# Patient Record
Sex: MALE | Race: WHITE | NOT HISPANIC OR LATINO | Employment: OTHER | URBAN - NONMETROPOLITAN AREA
[De-identification: names, ages, dates, MRNs, and addresses within clinical notes are randomized per-mention and may not be internally consistent; named-entity substitution may affect disease eponyms.]

---

## 2017-04-06 ENCOUNTER — APPOINTMENT (OUTPATIENT)
Dept: GENERAL RADIOLOGY | Facility: HOSPITAL | Age: 62
End: 2017-04-06

## 2017-04-06 ENCOUNTER — APPOINTMENT (OUTPATIENT)
Dept: CT IMAGING | Facility: HOSPITAL | Age: 62
End: 2017-04-06

## 2017-04-06 ENCOUNTER — HOSPITAL ENCOUNTER (EMERGENCY)
Facility: HOSPITAL | Age: 62
Discharge: OTHER INSTITUTION | End: 2017-04-07
Attending: EMERGENCY MEDICINE | Admitting: EMERGENCY MEDICINE

## 2017-04-06 DIAGNOSIS — R50.9 FEVER AND CHILLS: ICD-10-CM

## 2017-04-06 DIAGNOSIS — R41.82 ALTERED MENTAL STATUS, UNSPECIFIED: Primary | ICD-10-CM

## 2017-04-06 DIAGNOSIS — D72.829 LEUKOCYTOSIS, UNSPECIFIED TYPE: ICD-10-CM

## 2017-04-06 DIAGNOSIS — R53.1 GENERALIZED WEAKNESS: ICD-10-CM

## 2017-04-06 LAB
ALBUMIN SERPL-MCNC: 5.1 G/DL (ref 3.4–4.8)
ALBUMIN/GLOB SERPL: 1.6 G/DL (ref 1.5–2.5)
ALP SERPL-CCNC: 58 U/L (ref 40–129)
ALT SERPL W P-5'-P-CCNC: 32 U/L (ref 10–44)
AMPHET+METHAMPHET UR QL: NEGATIVE
AMYLASE SERPL-CCNC: 67 U/L (ref 28–100)
ANION GAP SERPL CALCULATED.3IONS-SCNC: 9.1 MMOL/L (ref 3.6–11.2)
AST SERPL-CCNC: 23 U/L (ref 10–34)
BACTERIA UR QL AUTO: ABNORMAL /HPF
BARBITURATES UR QL SCN: NEGATIVE
BASOPHILS # BLD AUTO: 0.02 10*3/MM3 (ref 0–0.3)
BASOPHILS NFR BLD AUTO: 0.1 % (ref 0–2)
BENZODIAZ UR QL SCN: NEGATIVE
BILIRUB SERPL-MCNC: 0.9 MG/DL (ref 0.2–1.8)
BILIRUB UR QL STRIP: NEGATIVE
BNP SERPL-MCNC: 75 PG/ML (ref 0–100)
BUN BLD-MCNC: 14 MG/DL (ref 7–21)
BUN/CREAT SERPL: 10.6 (ref 7–25)
CALCIUM SPEC-SCNC: 9.8 MG/DL (ref 7.7–10)
CANNABINOIDS SERPL QL: NEGATIVE
CHLORIDE SERPL-SCNC: 104 MMOL/L (ref 99–112)
CLARITY UR: CLEAR
CO2 SERPL-SCNC: 26.9 MMOL/L (ref 24.3–31.9)
COCAINE UR QL: NEGATIVE
COLOR UR: YELLOW
CREAT BLD-MCNC: 1.32 MG/DL (ref 0.43–1.29)
CRP SERPL-MCNC: <0.5 MG/DL (ref 0–0.99)
D-LACTATE SERPL-SCNC: 1.7 MMOL/L (ref 0.5–2)
DEPRECATED RDW RBC AUTO: 43.5 FL (ref 37–54)
EOSINOPHIL # BLD AUTO: 0 10*3/MM3 (ref 0–0.7)
EOSINOPHIL NFR BLD AUTO: 0 % (ref 0–5)
ERYTHROCYTE [DISTWIDTH] IN BLOOD BY AUTOMATED COUNT: 13.4 % (ref 11.5–14.5)
ERYTHROCYTE [SEDIMENTATION RATE] IN BLOOD: 7 MM/HR (ref 0–20)
ETHANOL BLD-MCNC: <10 MG/DL
ETHANOL UR QL: <0.01 %
GFR SERPL CREATININE-BSD FRML MDRD: 55 ML/MIN/1.73
GLOBULIN UR ELPH-MCNC: 3.1 GM/DL
GLUCOSE BLD-MCNC: 252 MG/DL (ref 70–110)
GLUCOSE BLDC GLUCOMTR-MCNC: 214 MG/DL (ref 70–130)
GLUCOSE UR STRIP-MCNC: ABNORMAL MG/DL
HCT VFR BLD AUTO: 48.9 % (ref 42–52)
HGB BLD-MCNC: 16.3 G/DL (ref 14–18)
HGB UR QL STRIP.AUTO: ABNORMAL
HYALINE CASTS UR QL AUTO: ABNORMAL /LPF
IMM GRANULOCYTES # BLD: 0.03 10*3/MM3 (ref 0–0.03)
IMM GRANULOCYTES NFR BLD: 0.2 % (ref 0–0.5)
INR PPP: 0.97 (ref 0.8–1.1)
KETONES UR QL STRIP: ABNORMAL
LEUKOCYTE ESTERASE UR QL STRIP.AUTO: NEGATIVE
LIPASE SERPL-CCNC: 34 U/L (ref 13–60)
LYMPHOCYTES # BLD AUTO: 1.02 10*3/MM3 (ref 1–3)
LYMPHOCYTES NFR BLD AUTO: 6.8 % (ref 21–51)
MAGNESIUM SERPL-MCNC: 1.6 MG/DL (ref 1.7–2.6)
MCH RBC QN AUTO: 29.6 PG (ref 27–33)
MCHC RBC AUTO-ENTMCNC: 33.3 G/DL (ref 33–37)
MCV RBC AUTO: 88.9 FL (ref 80–94)
METHADONE UR QL SCN: NEGATIVE
MONOCYTES # BLD AUTO: 0.55 10*3/MM3 (ref 0.1–0.9)
MONOCYTES NFR BLD AUTO: 3.6 % (ref 0–10)
NEUTROPHILS # BLD AUTO: 13.46 10*3/MM3 (ref 1.4–6.5)
NEUTROPHILS NFR BLD AUTO: 89.3 % (ref 30–70)
NITRITE UR QL STRIP: NEGATIVE
OPIATES UR QL: NEGATIVE
OSMOLALITY SERPL CALC.SUM OF ELEC: 288.4 MOSM/KG (ref 273–305)
OXYCODONE UR QL SCN: NEGATIVE
PCP UR QL SCN: NEGATIVE
PH UR STRIP.AUTO: 5.5 [PH] (ref 5–8)
PHOSPHATE SERPL-MCNC: 3 MG/DL (ref 2.7–4.5)
PLATELET # BLD AUTO: 222 10*3/MM3 (ref 130–400)
PMV BLD AUTO: 11.6 FL (ref 6–10)
POTASSIUM BLD-SCNC: 4.1 MMOL/L (ref 3.5–5.3)
PROPOXYPH UR QL: NEGATIVE
PROT SERPL-MCNC: 8.2 G/DL (ref 6–8)
PROT UR QL STRIP: ABNORMAL
PROTHROMBIN TIME: 10.8 SECONDS (ref 9.8–11.9)
RBC # BLD AUTO: 5.5 10*6/MM3 (ref 4.7–6.1)
RBC # UR: ABNORMAL /HPF
REF LAB TEST METHOD: ABNORMAL
SODIUM BLD-SCNC: 140 MMOL/L (ref 135–153)
SP GR UR STRIP: >=1.03 (ref 1–1.03)
SQUAMOUS #/AREA URNS HPF: ABNORMAL /HPF
TROPONIN I SERPL-MCNC: 0.01 NG/ML
TSH SERPL DL<=0.05 MIU/L-ACNC: 0.54 MIU/ML (ref 0.55–4.78)
UROBILINOGEN UR QL STRIP: ABNORMAL
WBC NRBC COR # BLD: 15.08 10*3/MM3 (ref 4.5–12.5)
WBC UR QL AUTO: ABNORMAL /HPF

## 2017-04-06 PROCEDURE — 86140 C-REACTIVE PROTEIN: CPT | Performed by: EMERGENCY MEDICINE

## 2017-04-06 PROCEDURE — 87186 SC STD MICRODIL/AGAR DIL: CPT | Performed by: EMERGENCY MEDICINE

## 2017-04-06 PROCEDURE — 85610 PROTHROMBIN TIME: CPT | Performed by: EMERGENCY MEDICINE

## 2017-04-06 PROCEDURE — 80307 DRUG TEST PRSMV CHEM ANLYZR: CPT | Performed by: EMERGENCY MEDICINE

## 2017-04-06 PROCEDURE — 93005 ELECTROCARDIOGRAM TRACING: CPT | Performed by: EMERGENCY MEDICINE

## 2017-04-06 PROCEDURE — 85652 RBC SED RATE AUTOMATED: CPT | Performed by: EMERGENCY MEDICINE

## 2017-04-06 PROCEDURE — 84100 ASSAY OF PHOSPHORUS: CPT | Performed by: EMERGENCY MEDICINE

## 2017-04-06 PROCEDURE — 87147 CULTURE TYPE IMMUNOLOGIC: CPT | Performed by: EMERGENCY MEDICINE

## 2017-04-06 PROCEDURE — 99285 EMERGENCY DEPT VISIT HI MDM: CPT

## 2017-04-06 PROCEDURE — 36415 COLL VENOUS BLD VENIPUNCTURE: CPT

## 2017-04-06 PROCEDURE — 80053 COMPREHEN METABOLIC PANEL: CPT | Performed by: EMERGENCY MEDICINE

## 2017-04-06 PROCEDURE — 71010 XR CHEST 1 VW: CPT | Performed by: RADIOLOGY

## 2017-04-06 PROCEDURE — 70450 CT HEAD/BRAIN W/O DYE: CPT

## 2017-04-06 PROCEDURE — 87077 CULTURE AEROBIC IDENTIFY: CPT | Performed by: EMERGENCY MEDICINE

## 2017-04-06 PROCEDURE — 82150 ASSAY OF AMYLASE: CPT | Performed by: EMERGENCY MEDICINE

## 2017-04-06 PROCEDURE — 83880 ASSAY OF NATRIURETIC PEPTIDE: CPT | Performed by: EMERGENCY MEDICINE

## 2017-04-06 PROCEDURE — 82962 GLUCOSE BLOOD TEST: CPT

## 2017-04-06 PROCEDURE — 93010 ELECTROCARDIOGRAM REPORT: CPT | Performed by: INTERNAL MEDICINE

## 2017-04-06 PROCEDURE — 81001 URINALYSIS AUTO W/SCOPE: CPT | Performed by: EMERGENCY MEDICINE

## 2017-04-06 PROCEDURE — 87040 BLOOD CULTURE FOR BACTERIA: CPT | Performed by: EMERGENCY MEDICINE

## 2017-04-06 PROCEDURE — 83690 ASSAY OF LIPASE: CPT | Performed by: EMERGENCY MEDICINE

## 2017-04-06 PROCEDURE — 84443 ASSAY THYROID STIM HORMONE: CPT | Performed by: EMERGENCY MEDICINE

## 2017-04-06 PROCEDURE — 85025 COMPLETE CBC W/AUTO DIFF WBC: CPT | Performed by: EMERGENCY MEDICINE

## 2017-04-06 PROCEDURE — 83605 ASSAY OF LACTIC ACID: CPT | Performed by: EMERGENCY MEDICINE

## 2017-04-06 PROCEDURE — 70450 CT HEAD/BRAIN W/O DYE: CPT | Performed by: RADIOLOGY

## 2017-04-06 PROCEDURE — 71010 HC CHEST PA OR AP: CPT

## 2017-04-06 PROCEDURE — 83735 ASSAY OF MAGNESIUM: CPT | Performed by: EMERGENCY MEDICINE

## 2017-04-06 PROCEDURE — 96361 HYDRATE IV INFUSION ADD-ON: CPT

## 2017-04-06 PROCEDURE — 96375 TX/PRO/DX INJ NEW DRUG ADDON: CPT

## 2017-04-06 PROCEDURE — 84484 ASSAY OF TROPONIN QUANT: CPT | Performed by: EMERGENCY MEDICINE

## 2017-04-06 RX ORDER — SODIUM CHLORIDE 0.9 % (FLUSH) 0.9 %
10 SYRINGE (ML) INJECTION AS NEEDED
Status: DISCONTINUED | OUTPATIENT
Start: 2017-04-06 | End: 2017-04-08 | Stop reason: HOSPADM

## 2017-04-06 RX ORDER — ACETAMINOPHEN 325 MG/1
1000 TABLET ORAL ONCE
Status: COMPLETED | OUTPATIENT
Start: 2017-04-06 | End: 2017-04-06

## 2017-04-06 RX ORDER — LISINOPRIL 20 MG/1
20 TABLET ORAL DAILY
COMMUNITY

## 2017-04-06 RX ORDER — GLIMEPIRIDE 2 MG/1
2 TABLET ORAL
COMMUNITY

## 2017-04-06 RX ADMIN — SODIUM CHLORIDE 500 ML: 9 INJECTION, SOLUTION INTRAVENOUS at 21:30

## 2017-04-06 RX ADMIN — ACETAMINOPHEN 975 MG: 325 TABLET, FILM COATED ORAL at 21:32

## 2017-04-07 VITALS
WEIGHT: 217 LBS | OXYGEN SATURATION: 96 % | SYSTOLIC BLOOD PRESSURE: 156 MMHG | DIASTOLIC BLOOD PRESSURE: 82 MMHG | HEART RATE: 64 BPM | TEMPERATURE: 98.5 F | BODY MASS INDEX: 32.14 KG/M2 | HEIGHT: 69 IN | RESPIRATION RATE: 14 BRPM

## 2017-04-07 LAB
A-A DO2: 28.9 MMHG (ref 0–300)
APPEARANCE CSF: CLEAR
APPEARANCE CSF: CLEAR
ARTERIAL PATENCY WRIST A: ABNORMAL
ATMOSPHERIC PRESS: 725 MMHG
BASE EXCESS BLDA CALC-SCNC: 0.9 MMOL/L
BDY SITE: ABNORMAL
BODY TEMPERATURE: 98.6 C
COHGB MFR BLD: 2.1 % (ref 0–5)
COLOR CSF: ABNORMAL
COLOR CSF: COLORLESS
FLUAV AG NPH QL: NEGATIVE
FLUBV AG NPH QL IA: NEGATIVE
GLUCOSE BLDC GLUCOMTR-MCNC: 158 MG/DL (ref 70–130)
GLUCOSE CSF-MCNC: 124 MG/DL (ref 50–75)
HAEM INFLU AG CSF QL: NEGATIVE
HCO3 BLDA-SCNC: 25.1 MMOL/L (ref 22–26)
HCT VFR BLD CALC: 46 % (ref 42–52)
HGB BLDA-MCNC: 15.5 G/DL (ref 12–16)
HOROWITZ INDEX BLD+IHG-RTO: 21 %
METHGB BLD QL: 0.4 % (ref 0–3)
MODALITY: ABNORMAL
N MEN SG A+Y AG CSF QL: NEGATIVE
N MEN SG B AG CSF QL: NEGATIVE
N MEN SG C+W135 AG CSF QL: NEGATIVE
NUC CELL # CSF MANUAL: 0 /MM3 (ref 0–5)
NUC CELL # CSF MANUAL: 0 /MM3 (ref 0–5)
OXYHGB MFR BLDV: 91.5 % (ref 85–100)
PCO2 BLDA: 39.1 MM HG (ref 35–45)
PH BLDA: 7.43 PH UNITS (ref 7.35–7.45)
PO2 BLDA: 66.7 MM HG (ref 80–100)
PROT CSF-MCNC: 32.2 MG/DL (ref 15–45)
RBC # CSF MANUAL: 2850 /MM3 (ref 0–0)
RBC # CSF MANUAL: 41 /MM3 (ref 0–0)
S PNEUM AG SER QL: NEGATIVE
SAO2 % BLDCOA: 93.8 % (ref 90–100)
SPECIMEN VOL CSF: 1 ML
SPECIMEN VOL CSF: 1 ML
TUBE # CSF: 1
TUBE # CSF: 4

## 2017-04-07 PROCEDURE — 25010000002 ONDANSETRON PER 1 MG: Performed by: EMERGENCY MEDICINE

## 2017-04-07 PROCEDURE — 25010000002 CEFTRIAXONE: Performed by: EMERGENCY MEDICINE

## 2017-04-07 PROCEDURE — 96367 TX/PROPH/DG ADDL SEQ IV INF: CPT

## 2017-04-07 PROCEDURE — 87206 SMEAR FLUORESCENT/ACID STAI: CPT | Performed by: EMERGENCY MEDICINE

## 2017-04-07 PROCEDURE — 25010000002 ZIPRASIDONE MESYLATE PER 10 MG

## 2017-04-07 PROCEDURE — 88112 CYTOPATH CELL ENHANCE TECH: CPT | Performed by: EMERGENCY MEDICINE

## 2017-04-07 PROCEDURE — 86403 PARTICLE AGGLUT ANTBDY SCRN: CPT | Performed by: EMERGENCY MEDICINE

## 2017-04-07 PROCEDURE — 96366 THER/PROPH/DIAG IV INF ADDON: CPT

## 2017-04-07 PROCEDURE — 82375 ASSAY CARBOXYHB QUANT: CPT | Performed by: EMERGENCY MEDICINE

## 2017-04-07 PROCEDURE — 96376 TX/PRO/DX INJ SAME DRUG ADON: CPT

## 2017-04-07 PROCEDURE — 87116 MYCOBACTERIA CULTURE: CPT | Performed by: EMERGENCY MEDICINE

## 2017-04-07 PROCEDURE — 82805 BLOOD GASES W/O2 SATURATION: CPT | Performed by: EMERGENCY MEDICINE

## 2017-04-07 PROCEDURE — 89050 BODY FLUID CELL COUNT: CPT | Performed by: EMERGENCY MEDICINE

## 2017-04-07 PROCEDURE — 87529 HSV DNA AMP PROBE: CPT | Performed by: EMERGENCY MEDICINE

## 2017-04-07 PROCEDURE — 25010000002 LORAZEPAM PER 2 MG: Performed by: EMERGENCY MEDICINE

## 2017-04-07 PROCEDURE — 82945 GLUCOSE OTHER FLUID: CPT | Performed by: EMERGENCY MEDICINE

## 2017-04-07 PROCEDURE — 25010000002 LORAZEPAM PER 2 MG

## 2017-04-07 PROCEDURE — 83916 OLIGOCLONAL BANDS: CPT | Performed by: EMERGENCY MEDICINE

## 2017-04-07 PROCEDURE — 84157 ASSAY OF PROTEIN OTHER: CPT | Performed by: EMERGENCY MEDICINE

## 2017-04-07 PROCEDURE — 87015 SPECIMEN INFECT AGNT CONCNTJ: CPT | Performed by: EMERGENCY MEDICINE

## 2017-04-07 PROCEDURE — 87899 AGENT NOS ASSAY W/OPTIC: CPT | Performed by: EMERGENCY MEDICINE

## 2017-04-07 PROCEDURE — 25010000002 VANCOMYCIN PER 500 MG: Performed by: EMERGENCY MEDICINE

## 2017-04-07 PROCEDURE — 36600 WITHDRAWAL OF ARTERIAL BLOOD: CPT | Performed by: EMERGENCY MEDICINE

## 2017-04-07 PROCEDURE — 87070 CULTURE OTHR SPECIMN AEROBIC: CPT | Performed by: EMERGENCY MEDICINE

## 2017-04-07 PROCEDURE — 96372 THER/PROPH/DIAG INJ SC/IM: CPT

## 2017-04-07 PROCEDURE — 96375 TX/PRO/DX INJ NEW DRUG ADDON: CPT

## 2017-04-07 PROCEDURE — 87804 INFLUENZA ASSAY W/OPTIC: CPT | Performed by: EMERGENCY MEDICINE

## 2017-04-07 PROCEDURE — 82962 GLUCOSE BLOOD TEST: CPT

## 2017-04-07 PROCEDURE — 96365 THER/PROPH/DIAG IV INF INIT: CPT

## 2017-04-07 PROCEDURE — 86592 SYPHILIS TEST NON-TREP QUAL: CPT | Performed by: EMERGENCY MEDICINE

## 2017-04-07 PROCEDURE — 83050 HGB METHEMOGLOBIN QUAN: CPT | Performed by: EMERGENCY MEDICINE

## 2017-04-07 PROCEDURE — 87205 SMEAR GRAM STAIN: CPT | Performed by: EMERGENCY MEDICINE

## 2017-04-07 RX ORDER — LORAZEPAM 2 MG/ML
1 INJECTION INTRAMUSCULAR ONCE
Status: COMPLETED | OUTPATIENT
Start: 2017-04-07 | End: 2017-04-07

## 2017-04-07 RX ORDER — WATER 1000 ML/1000ML
INJECTION, SOLUTION INTRAVENOUS
Status: DISPENSED
Start: 2017-04-07 | End: 2017-04-07

## 2017-04-07 RX ORDER — ACETAMINOPHEN 650 MG/1
650 SUPPOSITORY RECTAL ONCE
Status: COMPLETED | OUTPATIENT
Start: 2017-04-07 | End: 2017-04-07

## 2017-04-07 RX ORDER — ACETAMINOPHEN 650 MG/1
SUPPOSITORY RECTAL
Status: COMPLETED
Start: 2017-04-07 | End: 2017-04-07

## 2017-04-07 RX ORDER — LORAZEPAM 2 MG/ML
INJECTION INTRAMUSCULAR
Status: COMPLETED
Start: 2017-04-07 | End: 2017-04-07

## 2017-04-07 RX ORDER — ALUMINA, MAGNESIA, AND SIMETHICONE 2400; 2400; 240 MG/30ML; MG/30ML; MG/30ML
15 SUSPENSION ORAL ONCE
Status: COMPLETED | OUTPATIENT
Start: 2017-04-07 | End: 2017-04-07

## 2017-04-07 RX ORDER — FAMOTIDINE 10 MG/ML
20 INJECTION, SOLUTION INTRAVENOUS ONCE
Status: COMPLETED | OUTPATIENT
Start: 2017-04-07 | End: 2017-04-07

## 2017-04-07 RX ORDER — ONDANSETRON 2 MG/ML
4 INJECTION INTRAMUSCULAR; INTRAVENOUS ONCE
Status: COMPLETED | OUTPATIENT
Start: 2017-04-07 | End: 2017-04-07

## 2017-04-07 RX ORDER — HALOPERIDOL 5 MG/ML
0.5 INJECTION INTRAMUSCULAR ONCE
Status: DISCONTINUED | OUTPATIENT
Start: 2017-04-07 | End: 2017-04-08 | Stop reason: HOSPADM

## 2017-04-07 RX ORDER — PANTOPRAZOLE SODIUM 40 MG/10ML
40 INJECTION, POWDER, LYOPHILIZED, FOR SOLUTION INTRAVENOUS ONCE
Status: COMPLETED | OUTPATIENT
Start: 2017-04-07 | End: 2017-04-07

## 2017-04-07 RX ORDER — HALOPERIDOL 5 MG/ML
INJECTION INTRAMUSCULAR
Status: DISPENSED
Start: 2017-04-07 | End: 2017-04-07

## 2017-04-07 RX ORDER — ZIPRASIDONE MESYLATE 20 MG/ML
INJECTION, POWDER, LYOPHILIZED, FOR SOLUTION INTRAMUSCULAR
Status: COMPLETED
Start: 2017-04-07 | End: 2017-04-07

## 2017-04-07 RX ORDER — PHENOBARBITAL, HYOSCYAMINE SULFATE, ATROPINE SULFATE AND SCOPOLAMINE HYDROBROMIDE .0194; .1037; 16.2; .0065 MG/1; MG/1; MG/1; MG/1
2 TABLET ORAL ONCE
Status: COMPLETED | OUTPATIENT
Start: 2017-04-07 | End: 2017-04-07

## 2017-04-07 RX ORDER — ZIPRASIDONE MESYLATE 20 MG/ML
10 INJECTION, POWDER, LYOPHILIZED, FOR SOLUTION INTRAMUSCULAR ONCE
Status: COMPLETED | OUTPATIENT
Start: 2017-04-07 | End: 2017-04-07

## 2017-04-07 RX ORDER — LIDOCAINE HYDROCHLORIDE 20 MG/ML
INJECTION, SOLUTION INFILTRATION; PERINEURAL
Status: DISPENSED
Start: 2017-04-07 | End: 2017-04-07

## 2017-04-07 RX ADMIN — ACETAMINOPHEN 650 MG: 650 SUPPOSITORY RECTAL at 02:33

## 2017-04-07 RX ADMIN — LORAZEPAM 1 MG: 2 INJECTION INTRAMUSCULAR; INTRAVENOUS at 01:40

## 2017-04-07 RX ADMIN — PANTOPRAZOLE SODIUM 40 MG: 40 INJECTION, POWDER, FOR SOLUTION INTRAVENOUS at 20:54

## 2017-04-07 RX ADMIN — PHENOBARBITAL, HYOSCYAMINE SULFATE, ATROPINE SULFATE, SCOPOLAMINE HYDROBROMIDE 32.4 MG: 16.2; .1037; .0194; .0065 TABLET ORAL at 21:01

## 2017-04-07 RX ADMIN — CEFTRIAXONE 2 G: 2 INJECTION, POWDER, FOR SOLUTION INTRAMUSCULAR; INTRAVENOUS at 00:05

## 2017-04-07 RX ADMIN — LORAZEPAM 1 MG: 2 INJECTION INTRAMUSCULAR at 01:17

## 2017-04-07 RX ADMIN — LORAZEPAM 1 MG: 2 INJECTION INTRAMUSCULAR; INTRAVENOUS at 01:17

## 2017-04-07 RX ADMIN — VANCOMYCIN HYDROCHLORIDE 2000 MG: 5 INJECTION, POWDER, LYOPHILIZED, FOR SOLUTION INTRAVENOUS at 00:54

## 2017-04-07 RX ADMIN — FAMOTIDINE 20 MG: 10 INJECTION INTRAVENOUS at 20:58

## 2017-04-07 RX ADMIN — ALUMINUM HYDROXIDE, MAGNESIUM HYDROXIDE, AND DIMETHICONE 15 ML: 400; 400; 40 SUSPENSION ORAL at 21:01

## 2017-04-07 RX ADMIN — ZIPRASIDONE MESYLATE 10 MG: 20 INJECTION, POWDER, LYOPHILIZED, FOR SOLUTION INTRAMUSCULAR at 01:56

## 2017-04-07 RX ADMIN — ONDANSETRON 4 MG: 2 INJECTION INTRAMUSCULAR; INTRAVENOUS at 01:15

## 2017-04-07 RX ADMIN — LIDOCAINE HYDROCHLORIDE 15 ML: 20 SOLUTION ORAL; TOPICAL at 21:01

## 2017-04-07 NOTE — ED NOTES
Pt is alert/awake and confused/anxious.  Pt wife at bedside.  Pt continuously removes cardiac monitor and b/p cuff.  Unbable to continously monitor pt.  Dr. Matthew notified.  Family at bedside.      Augustina Nava RN  04/07/17 4742

## 2017-04-07 NOTE — ED NOTES
Called housekeeping to obtain  hospital bed for pt r/t waiting on bed at [facility].        Augustina Nava RN  04/07/17 0603

## 2017-04-07 NOTE — ED NOTES
Patient rolled to right side, resting comfortably on right side at this time. Patient denies any needs at this time, will continue to monitor.     Leanne Avitia RN  04/07/17 2643

## 2017-04-07 NOTE — ED PROVIDER NOTES
Subjective   History of Present Illness    Review of Systems    Past Medical History:   Diagnosis Date   • Dementia    • Diabetes mellitus    • Hypertension        Allergies   Allergen Reactions   • Ana-[place] Plus Cold [Chlorphen-Phenyleph-Asa]        Past Surgical History:   Procedure Laterality Date   • APPENDECTOMY         History reviewed. No pertinent family history.    Social History     Social History   • Marital status: Unknown     Spouse name: N/A   • Number of children: N/A   • Years of education: N/A     Social History Main Topics   • Smoking status: Former Smoker   • Smokeless tobacco: Former User     Types: Chew     Quit date: 3/2/2017      Comment: quit smoking 10 years ago   • Alcohol use No   • Drug use: No   • Sexual activity: Defer     Other Topics Concern   • None     Social History Narrative   • None           Objective   Physical Exam    Procedures         ED Course  ED Course   Value Comment By Time   XR Chest 1 View IMPRESSION:  No evidence of active or acute cardiopulmonary disease on today's chest  radiograph. Nam Matthew MD 04/07 0854   CT Head Without Contrast Stroke Protocol IMPRESSION:  No acute intracranial pathology. Nothing is seen on this exam to  specifically account for the patient's symptoms. Nam Matthew MD 04/07 0855   ECG 12 Lead Normal Sinus Rhythm.  No Acute Ischemia. Nam Matthew MD 04/07 0855    Patient brought to ED by his Wife for progressive worsening Altered Mental Status/Confusion that started suddenly yesterday morning.  Wife reports he complained of a severe headache, and left ear pain when he was more alert/oriented.  Confusion progressed throughout the day, and he became completely disoriented/delirious.  Extensive work-up done in the ED, and no acute abnormalities noted to explain such a drastic change in patient baseline.  Discussed case with Intensivist at [facility], and will transfer at this time for further  evaluation and treatment.  Patient discharged in Guarded Condition. Nam Matthew MD 04/07 7231    I assumed Mr. Adair care when I came in for day shift this morning at 7:30 AM.  Since that time his emergency department stay has been entirely uncomplicated.  He has been resting comfortably.  He has been hemodynamically stable.  He is still mildly confused, being disoriented to time and somewhat to situation.  He is oriented to person and place.  He has been calm and quite cooperative.  He has been on a waiting list for a bed at [facility] in [place].  I just spoke with Dr. Tellez, who is the accepting physician at [facility].  The plan is for Mr. Adair to go to a neuro telemetry bed.  Bedboard advises that they will have a bed to release for him at 7 PM.  Patient and his wife agree with the plan. Audi Reece MD 04/07 1600                  [facility]    Final diagnoses:   Altered mental status, unspecified   Fever and chills   Generalized weakness   Leukocytosis, unspecified type             Please note that portions of this note were completed with a voice recognition program. Efforts were made to edit the dictations, but occasionally words are mistranscribed.  ]     Audi Reece MD  04/08/17 1200

## 2017-04-07 NOTE — ED NOTES
Pt continues to be calm and resting at this time. Pt wife requests to allow pt to sleep and not check v/s at this time.  Dr. Matthew aware.  No acute distress noted at this time. Pt waiting for a bed a [facility].      Augustina Nava RN  04/07/17 0588

## 2017-04-07 NOTE — ED NOTES
Pt temp 100 degrees (rectal) at this time.  Dr. Vipul edmond.      Augustina Nava, RN  04/07/17 0541

## 2017-04-07 NOTE — ED NOTES
PT WIFE REFUSING TO SIGN [employer] FORM. DR. MONTAÑO MADE AWARE, MD REQUESTED TO BEDSIDE TO DISCUSS RISKS AND BENEFITS OF TRANSFER.      Bre Ruelas RN  04/07/17 3087

## 2017-04-07 NOTE — ED NOTES
Pt is alert and oriented x's 4 at this time. Pt spouse at bedside and state pt confusion is intermittent and that he continues to be weak.       Augustina Nava RN  04/06/17 0623

## 2017-04-07 NOTE — ED NOTES
[facility] of bed for [facility] at [facility]. She advised it is shift change and everyone is in report. Patient does have a bed and they will call back with bed assignment.     Jennifer Ivy  04/07/17 1914

## 2017-04-07 NOTE — ED NOTES
Housekeeping contacted for hospital bed. No hospital beds available in hospital at this time and ED will receive next one that becomes available     Dianne Estrella RN  04/07/17 6004

## 2017-04-07 NOTE — ED NOTES
Janice in [facility] we do not have another [facility] bed.  Pt turned and repositioned.      Augustina Nava, NICHOLE  04/07/17 0696

## 2017-04-07 NOTE — ED NOTES
[facility] called back still dont have a bed will call back if one becomes available      Vega Almeida  04/07/17 0854

## 2017-04-07 NOTE — PROGRESS NOTES
"Pt's spouse is named Manuel (sp?).  Manuel stated that she has \"no support system.\"  Pt and spouse have a strong [place] but do not have a connection to a particular [place] community.  Manuel is very worried about the root cause of these \"episodes\".  Pt has strong concerns about the financial repercussions re: treatment.  Pt uses humor to deflect the conversation away from his health.  Spouse gets very frustrated when he \"ignores\" his health.  I do not think pt fully realizes the necessity of seeking treatment.  He verbalized several times during my visit that \"he'll get better\" and that he'll \"get over it.\"    Manuel agreed to speak with me outside of the room when I prompted her.  I encouraged her to seek support from his family and encouraged her to consider herself the \"decision-maker\" because he may not fully understand what may be causing these episodes.  As I was speaking, Manuel abruptly left and went back to the pt's room.  I feel that Manuel will struggle being thrust into that role.      I encourage all providers to speak to Manuel as the primary decision maker and to help her understand the plan of care in order for her to make the best decisions for her spouse who is resistant to treatment because of the cost, lack of understanding, and unwillingness to accept changing circumstances.  Chaplain Olinda Alcantara  "

## 2017-04-07 NOTE — ED NOTES
Contacted [facility] about transfer of patient, stated they have no beds.      Kayla Uriostegui  04/07/17 0238

## 2017-04-07 NOTE — ED NOTES
Report received from NICHOLE Pickering. Patient resting on stretcher, wife at bedside. Patient and wife updated regarding transfer status, verbalize understanding. Patient eating bugles and grapes, no concerns voiced. Skin PWD, no signs or symptoms of respiratory distress noted, will continue to monitor.     Leanne Avitia RN  04/07/17 2129

## 2017-04-07 NOTE — ED NOTES
Pt continues to be agitated.  Dr. Matthew at bedside with new orders received.      Augustina Nava, RN  04/07/17 0201

## 2017-04-07 NOTE — ED NOTES
Patient resting on stretcher, wife at bedside.  Updated regarding transfer status, patient remains on wait list for bed assignment at [facility]. Patient and wife verbalize understanding, will continue to monitor.      Leanne Avitia RN  04/07/17 3354

## 2017-04-07 NOTE — ED NOTES
Contacted [facility] about transfer of patient, stated they had no beds.      Kayla Uriostegui  04/07/17 1807

## 2017-04-07 NOTE — ED NOTES
Contacted [facility] about transfer of patient, stated they had no beds.      Kayla Uriostegui  04/07/17 0507

## 2017-04-07 NOTE — ED NOTES
ATTEMPTED TO CALL REPORT TO [facility] DANISHA COOK RN. SHE REPORTS SHE WILL DISCUSS PT WITH LEAD RN AND CALL BACK. SARAH SANCHEZ RN LEAD AND DR. MONTAÑO MADE AWARE.      Bre Ruelas RN  04/07/17 4024

## 2017-04-07 NOTE — ED NOTES
Patient awaiting bed at [facility]. [facility] reports to Dr. lA that they still do not have available bed for patient, they are continuing to try to find bed and that they will call us back and update us in 4 hours or when bed becomes available, whichever comes first     Dianne Estrella RN  04/07/17 0891

## 2017-04-07 NOTE — ED NOTES
Patient sitting upright on side of bed, updated regarding transfer status. Wife administering blood pressure and diabetes medications from patients home supply per Dr. Reece's orders. Will continue to monitor.      Leanne Avitia RN  04/07/17 3140

## 2017-04-07 NOTE — ED PROVIDER NOTES
Subjective   Patient is a 61 y.o. male presenting with altered mental status.   History provided by:  Spouse  History limited by:  Acuity of condition and mental status change  Altered Mental Status   Presenting symptoms: behavior changes, confusion, disorientation, lethargy and memory loss    Presenting symptoms: no combativeness, no partial responsiveness and no unresponsiveness    Severity:  Severe  Most recent episode:  Today  Episode history:  Single  Timing:  Constant  Progression:  Worsening  Chronicity:  New  Context: dementia    Context: not alcohol use, not drug use, not head injury, not homeless, taking medications as prescribed, not nursing home resident, not recent change in medication, not recent illness and not recent infection    Associated symptoms: fever and weakness    Associated symptoms: no abdominal pain, normal movement, no agitation, no bladder incontinence, no decreased appetite, no depression, no difficulty breathing, no eye deviation, no hallucinations, no headaches, no light-headedness, no nausea, no palpitations, no rash, no seizures, no slurred speech, no suicidal behavior, no visual change and no vomiting    Fever:     Timing:  Constant    Temp source:  Subjective    Progression:  Worsening  Weakness:     Severity:  Mild    Onset quality:  Gradual    Timing:  Constant    Progression:  Worsening    Chronicity:  New      Review of Systems   Unable to perform ROS: Mental status change   Constitutional: Positive for chills, diaphoresis, fatigue and fever. Negative for decreased appetite.   HENT: Positive for ear pain. Negative for congestion.    Eyes: Positive for visual disturbance. Negative for photophobia and pain.   Respiratory: Negative for cough, shortness of breath and wheezing.    Cardiovascular: Negative for palpitations.   Gastrointestinal: Negative for abdominal pain, constipation, diarrhea, nausea and vomiting.   Genitourinary: Negative for bladder incontinence.   Skin:  Positive for pallor. Negative for rash and wound.   Neurological: Positive for speech difficulty and weakness. Negative for tremors, seizures, syncope, facial asymmetry, light-headedness, numbness and headaches.   Hematological: Negative for adenopathy.   Psychiatric/Behavioral: Positive for confusion and memory loss. Negative for agitation and hallucinations.       Past Medical History:   Diagnosis Date   • Dementia    • Diabetes mellitus    • Hypertension        Allergies   Allergen Reactions   • Ana-[place] Plus Cold [Chlorphen-Phenyleph-Asa]        Past Surgical History:   Procedure Laterality Date   • APPENDECTOMY         History reviewed. No pertinent family history.    Social History     Social History   • Marital status: Unknown     Spouse name: N/A   • Number of children: N/A   • Years of education: N/A     Social History Main Topics   • Smoking status: Former Smoker   • Smokeless tobacco: Former User     Types: Chew     Quit date: 3/2/2017      Comment: quit smoking 10 years ago   • Alcohol use No   • Drug use: No   • Sexual activity: Defer     Other Topics Concern   • None     Social History Narrative   • None           Objective   Physical Exam   Constitutional: He appears well-developed and well-nourished.  Non-toxic appearance. He has a sickly appearance. He appears ill. No distress.   HENT:   Head: Normocephalic and atraumatic.   Right Ear: External ear normal.   Left Ear: External ear normal.   Nose: Nose normal.   Mouth/Throat: Oropharynx is clear and moist and mucous membranes are normal. No oropharyngeal exudate. No tonsillar exudate.   Eyes: Conjunctivae, EOM and lids are normal. Pupils are equal, round, and reactive to light.   Neck: Normal range of motion and full passive range of motion without pain. Neck supple. No thyromegaly present.   Cardiovascular: Normal rate, regular rhythm, S1 normal, S2 normal, normal heart sounds, intact distal pulses and normal pulses.    Pulmonary/Chest: Effort  normal and breath sounds normal. No tachypnea. No respiratory distress. He has no decreased breath sounds. He has no wheezes. He has no rales. He exhibits no tenderness.   Abdominal: Soft. Normal appearance and bowel sounds are normal. He exhibits no distension. There is no tenderness. There is no rebound and no guarding.   Musculoskeletal: Normal range of motion. He exhibits no edema, tenderness or deformity.   Lymphadenopathy:     He has no cervical adenopathy.   Neurological: He is alert. He has normal strength. No cranial nerve deficit or sensory deficit. GCS eye subscore is 4. GCS verbal subscore is 5. GCS motor subscore is 6.   Skin: Skin is warm and intact. No rash noted. He is diaphoretic. No erythema. There is pallor.   Psychiatric: His affect is inappropriate. He is slowed and withdrawn. Cognition and memory are impaired.   Nursing note and vitals reviewed.      Bedside Lumbar Puncture  Date/Time: 4/7/2017 3:45 AM  Performed by: NAIN VÁSQUEZ  Authorized by: NAIN VÁSQUEZ     Consent:     Consent obtained:  Emergent situation and written (From Wife)    Consent given by:  Spouse    Risks discussed:  Bleeding, infection, pain, headache, repeat procedure and nerve damage  Pre-procedure details:     Procedure purpose:  Diagnostic    Preparation: Patient was prepped and draped in usual sterile fashion    Anesthesia (see MAR for exact dosages):     Anesthesia method:  Local infiltration    Local anesthetic:  Lidocaine 2% w/o epi  Procedure details:     Lumbar space:  L4-L5 interspace    Patient position:  R lateral decubitus    Needle gauge:  22    Needle type:  Spinal needle - Quincke tip    Needle length (in):  1.5    Ultrasound guidance: no      Number of attempts:  1    Fluid appearance:  Clear    Tubes of fluid:  4    Total volume (ml):  20  Post-procedure:     Puncture site:  Adhesive bandage applied    Patient tolerance of procedure:  Tolerated well, no immediate  complications             ED Course  ED Course   Value Comment By Time   XR Chest 1 View IMPRESSION:  No evidence of active or acute cardiopulmonary disease on today's chest  radiograph. Nam Matthew MD 04/07 0854   CT Head Without Contrast Stroke Protocol IMPRESSION:  No acute intracranial pathology. Nothing is seen on this exam to  specifically account for the patient's symptoms. Nam Matthew MD 04/07 0855   ECG 12 Lead Normal Sinus Rhythm.  No Acute Ischemia. Nam Matthew MD 04/07 0855    Patient brought to ED by his Wife for progressive worsening Altered Mental Status/Confusion that started suddenly yesterday morning.  Wife reports he complained of a severe headache, and left ear pain when he was more alert/oriented.  Confusion progressed throughout the day, and he became completely disoriented/delirious.  Extensive work-up done in the ED, and no acute abnormalities noted to explain such a drastic change in patient baseline.  Discussed case with Intensivist at [facility], and will transfer at this time for further evaluation and treatment.  Patient discharged in Guarded Condition. Nam Matthew MD 04/07 0931                  MDM  Number of Diagnoses or Management Options  Altered mental status, unspecified: new and requires workup  Fever and chills: new and requires workup  Generalized weakness: new and requires workup  Leukocytosis, unspecified type: new and requires workup     Amount and/or Complexity of Data Reviewed  Clinical lab tests: ordered and reviewed  Tests in the radiology section of CPT®: ordered and reviewed  Tests in the medicine section of CPT®: ordered and reviewed  Discuss the patient with other providers: yes  Independent visualization of images, tracings, or specimens: yes    Risk of Complications, Morbidity, and/or Mortality  Presenting problems: high  Diagnostic procedures: high  Management options: high    Patient Progress  Patient  progress: stable      Final diagnoses:   Altered mental status, unspecified   Fever and chills   Generalized weakness   Leukocytosis, unspecified type            Nam Matthew MD  04/07/17 7833

## 2017-04-07 NOTE — ED NOTES
Contacted [facility] about transfer of patient, stated they would call back about being on a wait list.      Kayla Uriostegui  04/07/17 024

## 2017-04-07 NOTE — ED NOTES
[facility] called still no bed would called back when they have one      Vega Almeida  04/07/17 5125

## 2017-04-07 NOTE — ED NOTES
Pt is calm at this time and sleeping.  Pt o2 sat 97-88% ra. Dr. Matthew notified with new orders received.      Augustina Nava RN  04/07/17 0227

## 2017-04-07 NOTE — ED NOTES
Pt continues to be agitated with worsening confusion. Pt is disoriented.  Pt will not allow to have b/p taken at this time.  Pt will not leave cardiac monitor in place.  Dr. Matthew at bedside with new orders received.      Augustina Nava RN  04/07/17 0208       Augustina Nava RN  04/07/17 0238

## 2017-04-07 NOTE — ED NOTES
Called [facility] dispatch about BLS transport to [facility]. They said they do have a truck available and will page it out.     Jennifer Ivy  04/07/17 1948

## 2017-04-07 NOTE — ED NOTES
Pt remains calm at this time. Awake and confused with wife at bedside. No acute changes in condition.      Augustina Nava RN  04/07/17 1311

## 2017-04-07 NOTE — ED NOTES
Patient sleeping on stretcher, awakens to verbal stimuli. Patient positioned on back, voices no concerns at this time, will continue to monitor.     Leanne Avitia RN  04/07/17 4680

## 2017-04-07 NOTE — ED NOTES
Continuing to try to find pt hospital bed. Housekeeping continues to try to find hospital bed and house supervisor notified     Dianne Estrella RN  04/07/17 7269

## 2017-04-07 NOTE — ED NOTES
Patient placed on hospital bed, positioned on back, updated regarding transfer status. Patient voices no needs at this time, will continue to monitor.      Leanne Avitia RN  04/07/17 6054

## 2017-04-07 NOTE — ED NOTES
Consent for Diagnostic Lumbar Puncture obtained by pt wife at this time.     Augustina Nava, NICHOLE  04/07/17 0057

## 2017-04-07 NOTE — ED NOTES
Contacted [facility] about transfer of patient, stated they had no beds.      Kayla Uriostegui  04/07/17 4519

## 2017-04-07 NOTE — ED NOTES
"Patient's wife called out, states that he is getting up out of bed. Patient found sitting at end of stretcher, patient states, \"I need to go home, I have things that I need to do.\" Patient alert and oriented x3 at this time. Dr. Reece aware. Patient and wife resting in treatment room, will continue to monitor.     Leanne Avitia RN  04/07/17 1050    "

## 2017-04-07 NOTE — ED NOTES
Patient resting on stretcher, wife at bedside, patient offered toileting at this time, remains on right side. Patient denies any needs at this time, will continue to monitor.      Leanne Avitia RN  04/07/17 1123

## 2017-04-07 NOTE — ED NOTES
Pt is becoming agitated and trying to pull out iv at this time.  Pt removed b/p cuff and cardiac monitor leads.  Dr. Matthew at bedside with new orders received.  Pt wife and son at bedside.     Augustina Nava RN  04/07/17 0126

## 2017-04-07 NOTE — ED NOTES
PT'S WIFE STATES THAT PT WOKE UP THIS MORNING COMPLAINING RIGHT EAR HURTING THAT PROGRESSED INTO A HEADACHE AND NECK PAIN PT'S WIFE STATES THAT AT 2PM PT ACTED LIKE HE COULDN'T HEAR AND COMMUNICATE WIFE STATES THAT SHE TRIED TO GET HIM OFF THE COUGH TO GO TO BED  AROUND 5 PM WIFE REALIZED THAT THAT PT BARELY COULD GET UP AND WAS HAVING CONFUSION      Briana Poole, NICHOLE  04/06/17 7390

## 2017-04-07 NOTE — ED NOTES
Oxygen at 2 l/m nc placed at this time.  Pt oxygen sat rapidly increases to 95%.  Family at bedside.  Able to place cardiac monitor and b/p cuff back on pt at this time.  B/P 137/79, HR 88 NSR, rr 19.  Temp 101.7 Rectal.  Dr. Matthew notified of temp.      Augustina Nava, RN  04/07/17 0226

## 2017-04-10 LAB
BACTERIA SPEC AEROBE CULT: NO GROWTH
GRAM STN SPEC: NORMAL
GRAM STN SPEC: NORMAL
HSV1 DNA SPEC QL NAA+PROBE: NEGATIVE
HSV2 DNA SPEC QL NAA+PROBE: NEGATIVE
REAGIN AB CSF QL: NON REACTIVE
SPECIMEN STATUS: NORMAL

## 2017-04-11 LAB
BACTERIA SPEC AEROBE CULT: ABNORMAL
BACTERIA SPEC AEROBE CULT: NORMAL
GRAM STN SPEC: ABNORMAL
LAB AP CASE REPORT: NORMAL
Lab: NORMAL
OLIGOCLONAL BANDS.IT SER+CSF QL: (no result)

## 2017-04-12 LAB
CAP MANDATED REFLEX TO CULTURE: NORMAL
CRYPTOC AG CSF QL IA.RAPID: NEGATIVE

## 2017-05-20 LAB
ACID FAST STN SPEC: NEGATIVE
BACTERIA SPEC AEROBE CULT: NEGATIVE
SPECIMEN PREPARATION: NORMAL

## 2017-06-18 NOTE — ED NOTES
Patient turned to left side and pillows placed under right side to help support him. Skin warm pink and dry. Patient continues to rest and remains in no apparent distress. Guest meal tray ordered for patients wife. Call light within reach of patient and patient wife     Dianne Estrella RN  04/07/17 0863     I will STOP taking the medications listed below when I get home from the hospital:  None

## 2017-10-24 ENCOUNTER — TELEPHONE (OUTPATIENT)
Dept: PSYCHIATRY | Facility: CLINIC | Age: 62
End: 2017-10-24

## 2017-10-24 ENCOUNTER — OFFICE VISIT (OUTPATIENT)
Dept: PSYCHIATRY | Facility: CLINIC | Age: 62
End: 2017-10-24

## 2017-10-24 VITALS
DIASTOLIC BLOOD PRESSURE: 91 MMHG | HEIGHT: 69 IN | BODY MASS INDEX: 30.36 KG/M2 | WEIGHT: 205 LBS | SYSTOLIC BLOOD PRESSURE: 188 MMHG | HEART RATE: 71 BPM

## 2017-10-24 DIAGNOSIS — F02.81 ALZHEIMER'S DEMENTIA WITH BEHAVIORAL DISTURBANCE, UNSPECIFIED TIMING OF DEMENTIA ONSET: Primary | ICD-10-CM

## 2017-10-24 DIAGNOSIS — Z79.899 MEDICATION MANAGEMENT: ICD-10-CM

## 2017-10-24 DIAGNOSIS — G30.9 ALZHEIMER'S DEMENTIA WITH BEHAVIORAL DISTURBANCE, UNSPECIFIED TIMING OF DEMENTIA ONSET: Primary | ICD-10-CM

## 2017-10-24 LAB
AMPHETAMINE CUT-OFF: NORMAL
BENZODIAZIPINE CUT-OFF: NORMAL
BUPRENORPHINE CUT-OFF: NORMAL
COCAINE CUT-OFF: NORMAL
EXTERNAL AMPHETAMINE SCREEN URINE: NEGATIVE
EXTERNAL BENZODIAZEPINE SCREEN URINE: NEGATIVE
EXTERNAL BUPRENORPHINE SCREEN URINE: NEGATIVE
EXTERNAL COCAINE SCREEN URINE: NEGATIVE
EXTERNAL MDMA: NEGATIVE
EXTERNAL METHADONE SCREEN URINE: NEGATIVE
EXTERNAL METHAMPHETAMINE SCREEN URINE: NEGATIVE
EXTERNAL OPIATES SCREEN URINE: NEGATIVE
EXTERNAL OXYCODONE SCREEN URINE: NEGATIVE
EXTERNAL THC SCREEN URINE: NEGATIVE
MDMA CUT-OFF: NORMAL
METHADONE CUT-OFF: NORMAL
METHAMPHETAMINE CUT-OFF: NORMAL
OPIATES CUT-OFF: NORMAL
OXYCODONE CUT-OFF: NORMAL
THC CUT-OFF: NORMAL

## 2017-10-24 PROCEDURE — 90792 PSYCH DIAG EVAL W/MED SRVCS: CPT | Performed by: NURSE PRACTITIONER

## 2017-10-24 RX ORDER — CITALOPRAM 20 MG/1
TABLET ORAL
Qty: 30 TABLET | Refills: 1 | Status: SHIPPED | OUTPATIENT
Start: 2017-10-24 | End: 2017-11-21 | Stop reason: SDUPTHER

## 2017-10-24 RX ORDER — ASPIRIN 81 MG/1
81 TABLET ORAL DAILY
COMMUNITY

## 2017-10-24 RX ORDER — ESCITALOPRAM OXALATE 5 MG/1
5 TABLET ORAL DAILY
Qty: 30 TABLET | Refills: 1 | Status: SHIPPED | OUTPATIENT
Start: 2017-10-24 | End: 2017-11-21

## 2017-10-24 RX ORDER — RISPERIDONE 0.5 MG/1
0.5 TABLET ORAL 2 TIMES DAILY
Qty: 60 TABLET | Refills: 1 | Status: SHIPPED | OUTPATIENT
Start: 2017-10-24 | End: 2017-11-21 | Stop reason: SDUPTHER

## 2017-10-24 RX ORDER — DONEPEZIL HYDROCHLORIDE 5 MG/1
5 TABLET, FILM COATED ORAL NIGHTLY
COMMUNITY

## 2017-10-24 NOTE — TELEPHONE ENCOUNTER
Called and stated the Lexapro was $90 and they couldn't afford wondering if you could call in something different
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
## 2017-11-21 ENCOUNTER — OFFICE VISIT (OUTPATIENT)
Dept: PSYCHIATRY | Facility: CLINIC | Age: 62
End: 2017-11-21

## 2017-11-21 VITALS
DIASTOLIC BLOOD PRESSURE: 71 MMHG | SYSTOLIC BLOOD PRESSURE: 135 MMHG | WEIGHT: 212 LBS | HEIGHT: 69 IN | HEART RATE: 73 BPM | BODY MASS INDEX: 31.4 KG/M2

## 2017-11-21 DIAGNOSIS — F02.81 ALZHEIMER'S DEMENTIA WITH BEHAVIORAL DISTURBANCE, UNSPECIFIED TIMING OF DEMENTIA ONSET: ICD-10-CM

## 2017-11-21 DIAGNOSIS — G30.9 ALZHEIMER'S DEMENTIA WITH BEHAVIORAL DISTURBANCE, UNSPECIFIED TIMING OF DEMENTIA ONSET: ICD-10-CM

## 2017-11-21 PROCEDURE — 99213 OFFICE O/P EST LOW 20 MIN: CPT | Performed by: NURSE PRACTITIONER

## 2017-11-21 RX ORDER — RISPERIDONE 0.5 MG/1
0.5 TABLET ORAL 2 TIMES DAILY
Qty: 60 TABLET | Refills: 1 | Status: SHIPPED | OUTPATIENT
Start: 2017-11-21 | End: 2017-11-27 | Stop reason: SDUPTHER

## 2017-11-21 RX ORDER — MIRTAZAPINE 7.5 MG/1
7.5 TABLET, FILM COATED ORAL NIGHTLY
Qty: 30 TABLET | Refills: 1 | Status: SHIPPED | OUTPATIENT
Start: 2017-11-21 | End: 2017-12-19

## 2017-11-21 RX ORDER — CITALOPRAM 20 MG/1
TABLET ORAL
Qty: 30 TABLET | Refills: 1 | Status: SHIPPED | OUTPATIENT
Start: 2017-11-21 | End: 2017-12-19 | Stop reason: SDUPTHER

## 2017-11-21 NOTE — PROGRESS NOTES
"      Subjective   Sherman Adair is a 62 y.o. male is here today for medication management follow-up.He presents today with his wife whom he consents to be present.     Chief Complaint:  Reports woke up with headache this morning, more fatigued, unable to remember where he is. Went to eat at [employer] and wife lost him, found him in lobby. He has been more quiet than usual today. Observed yawning during visit. Reports \"I feel tired and sleepy.\" He hasn't been sleeping well at night. Wife reports he is up and down all night, sometimes going to the bathroom, other times  His behavior has improved with \"explosive behavior\" only occurring about once weekly. Wife states last incident was 1.5 weeks ago where he accused wife of leaving the house when in fact she was on couch all night. He is currently denying any AH/VH/SI/HI. Last week however he kept hearing \"a grasshopper, a buzzing deal.\" No one else in the house was able to hear this noise. He has an appointment with neurologist at the end of December at [facility]. He is compliant with taking his medications. He is doing better with hygiene, getting up by and eating breakfast on own. He does continue to need assistance going to get in bed per wife. Wife shares he often will wake up at night and just stare at his wife for up to 3 hours at a time. Wife states he is acting more like himself. He went to [employer] the other day by himself and was able to remember what wife had asked him to bring her home. He was able to go fishing with mentally handicapped relative and had an enjoyable time. He has been increasingly talking about death at home and his fear of dying. Tells his wife he hopes he goes before she does. He isn't isolating. Recently took trip with wife to [place], [place] and had a good time. He has been going [facility] [place] and [facility]. Shares his feet feel \"froze\" but when wife will touch them they are warm. Denies any other medical complaints.He had " "started some new stomach pill and it caused him diarrhea/upset stomach so he isn't taking that med (he couldn't recall name). He has became increasingly clumsy over the past year, wife notices he spills drinks at times, etc. He seems to remember more frequent events than last visit.    I reviewed his records from [facility] including CT on 4/8/17 indicating small acute infarct within the right temporal cortex and atrophy and chronic white matter changes.  CXR normal         History of Present Illness    The following portions of the patient's history were reviewed and updated as appropriate: allergies, current medications, past family history, past medical history, past social history, past surgical history and problem list.    Review of Systems   Constitutional: Negative for activity change, appetite change and fatigue.   HENT: Negative.    Eyes: Negative for visual disturbance.   Respiratory: Negative.    Cardiovascular: Negative.    Gastrointestinal: Negative for nausea.   Endocrine: Negative.    Genitourinary: Negative.    Musculoskeletal: Negative for arthralgias.   Skin: Negative.    Allergic/Immunologic: Negative.    Neurological: Negative for dizziness, seizures and headaches.   Hematological: Negative.    Psychiatric/Behavioral: Positive for confusion and sleep disturbance. Negative for agitation, behavioral problems, decreased concentration, dysphoric mood, hallucinations, self-injury and suicidal ideas. The patient is not nervous/anxious and is not hyperactive.        Objective   Physical Exam   Constitutional: He is oriented to person, place, and time. He appears well-developed and well-nourished. No distress.   Neurological: He is alert and oriented to person, place, and time.   Skin: He is not diaphoretic.   Nursing note and vitals reviewed.    Blood pressure 135/71, pulse 73, height 69\" (175.3 cm), weight 212 lb (96.2 kg).    Medication List:   Current Outpatient Prescriptions   Medication Sig Dispense " Refill   • aspirin 81 MG EC tablet Take 81 mg by mouth Daily.     • citalopram (CELEXA) 20 MG tablet Take 1/2 tablet by mouth daily for 7 days then increase to 1 tablet by mouth daily 30 tablet 1   • donepezil (ARICEPT) 5 MG tablet Take 5 mg by mouth Every Night.     • lisinopril (PRINIVIL,ZESTRIL) 20 MG tablet Take 20 mg by mouth Daily.     • metFORMIN (GLUCOPHAGE) 500 MG tablet Take 500 mg by mouth 2 (Two) Times a Day With Meals.     • risperiDONE (risperDAL) 0.5 MG tablet Take 1 tablet by mouth 2 (Two) Times a Day. 60 tablet 1   • glimepiride (AMARYL) 2 MG tablet Take 2 mg by mouth Every Morning Before Breakfast.     • mirtazapine (REMERON) 7.5 MG tablet Take 1 tablet by mouth Every Night. 30 tablet 1     No current facility-administered medications for this visit.        Mental Status Exam:   Hygiene:   good  Cooperation:  Cooperative  Eye Contact:  Poor  Psychomotor Behavior:  Appropriate  Affect:  Full range  Hopelessness: Denies  Speech:  Normal  Thought Process:  Goal directed and Linear  Thought Content:  Normal  Suicidal:  None  Homicidal:  None  Hallucinations:  None  Delusion:  None  Memory:  Intact  Orientation:  Person, Place, Time and Situation  Reliability:  fair  Insight:  Fair  Judgement:  Fair  Impulse Control:  Fair  Physical/Medical Issues:  Yes hx of stroke, type 2 diabetes, HTN    Assessment/Plan   Diagnoses and all orders for this visit:    Alzheimer's dementia with behavioral disturbance, unspecified timing of dementia onset  -     citalopram (CELEXA) 20 MG tablet; Take 1/2 tablet by mouth daily for 7 days then increase to 1 tablet by mouth daily  -     risperiDONE (risperDAL) 0.5 MG tablet; Take 1 tablet by mouth 2 (Two) Times a Day.  -     mirtazapine (REMERON) 7.5 MG tablet; Take 1 tablet by mouth Every Night.      Discussed medication options. Pt is showing improvement in behavioral disturbances. Acting more like himself. Continue risperdal and celexa. Will add remeron to help with  sleep. Difficult to tell whether lack of sleep is causing his drowsiness during day or if the low dose of risperdal is causing excess sedation. Will continue to monitor this side effect.   Reviewed the risks, benefits, and side effects of the medications; patient acknowledged and verbally consented.  Patient is agreeable to call the [facility].  Patient is aware to call 911 or go to the nearest ER should begin having SI/HI.   Due to him being on risperdal, I would like to have baseline labs which patient states was recently done at PCP. Therefore, we will request these records. Also, we will monitor weight due to being on risperdal as he had a 7lb increase from last visit.      RTC 4 weeks

## 2017-11-27 ENCOUNTER — TELEPHONE (OUTPATIENT)
Dept: PSYCHIATRY | Facility: CLINIC | Age: 62
End: 2017-11-27

## 2017-11-27 DIAGNOSIS — G30.9 ALZHEIMER'S DEMENTIA WITH BEHAVIORAL DISTURBANCE, UNSPECIFIED TIMING OF DEMENTIA ONSET: ICD-10-CM

## 2017-11-27 DIAGNOSIS — F02.81 ALZHEIMER'S DEMENTIA WITH BEHAVIORAL DISTURBANCE, UNSPECIFIED TIMING OF DEMENTIA ONSET: ICD-10-CM

## 2017-11-27 RX ORDER — RISPERIDONE 1 MG/1
1 TABLET ORAL 2 TIMES DAILY
Qty: 60 TABLET | Refills: 0 | Status: SHIPPED | OUTPATIENT
Start: 2017-11-27 | End: 2017-12-19

## 2017-11-27 NOTE — TELEPHONE ENCOUNTER
Called and made patients wife aware of Hollys increase in meds patients wife stated that she would try it but he was still no better advise patient to call back on Thursday or take patient to the ER

## 2017-11-27 NOTE — TELEPHONE ENCOUNTER
Please contact pt's wife and tell her to increase risperdal from 0.5mg twice daily to 1mg twice daily. This should help with the psychotic behavior. Let her know I sent in the new RX to [facility] pharmacy in [place]. Let me know if she has any other questions or if in the next couple days the symptoms continue. Thanks.

## 2017-11-27 NOTE — TELEPHONE ENCOUNTER
Wife called this morning stated that Sherman has gotton worse stated that he thinks she has been out with a man all night starting back the same old thing that you all had talked about Wife states she does not know what to do with him. Please advise

## 2017-11-29 NOTE — TELEPHONE ENCOUNTER
Yes please have her bring him to ED for psychosis/aggression. I will make ED aware. If she is unable to transport him herself she should call 911.
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
## 2017-12-01 ENCOUNTER — OUTSIDE FACILITY SERVICE (OUTPATIENT)
Dept: PSYCHIATRY | Facility: CLINIC | Age: 62
End: 2017-12-01

## 2017-12-04 ENCOUNTER — TELEPHONE (OUTPATIENT)
Dept: PSYCHIATRY | Facility: CLINIC | Age: 62
End: 2017-12-04

## 2017-12-05 ENCOUNTER — TELEPHONE (OUTPATIENT)
Dept: PSYCHIATRY | Facility: CLINIC | Age: 62
End: 2017-12-05

## 2017-12-05 NOTE — TELEPHONE ENCOUNTER
Already received call from Seema Khan [facility] who is currently over Sherman's care as he is inpatient at [facility] for psychosis.   Kay doesn't want him on risperdal which has apparently kept him stable and without psychosis while under the care of Seema. He is also AOx4 and capable of making his own decisions, is his own guardian. I will be happy to have him back and follow-up upon his discharge from [facility] and resume his medication management.   I have spoke with Kay and explained this in detail, as did [facility].   Thank you.

## 2017-12-19 ENCOUNTER — OFFICE VISIT (OUTPATIENT)
Dept: PSYCHIATRY | Facility: CLINIC | Age: 62
End: 2017-12-19

## 2017-12-19 ENCOUNTER — TELEPHONE (OUTPATIENT)
Dept: PSYCHIATRY | Facility: CLINIC | Age: 62
End: 2017-12-19

## 2017-12-19 VITALS
WEIGHT: 218 LBS | DIASTOLIC BLOOD PRESSURE: 66 MMHG | HEIGHT: 69 IN | HEART RATE: 61 BPM | SYSTOLIC BLOOD PRESSURE: 132 MMHG | BODY MASS INDEX: 32.29 KG/M2

## 2017-12-19 DIAGNOSIS — F02.81 ALZHEIMER'S DEMENTIA WITH BEHAVIORAL DISTURBANCE, UNSPECIFIED TIMING OF DEMENTIA ONSET: Primary | ICD-10-CM

## 2017-12-19 DIAGNOSIS — G30.9 ALZHEIMER'S DEMENTIA WITH BEHAVIORAL DISTURBANCE, UNSPECIFIED TIMING OF DEMENTIA ONSET: Primary | ICD-10-CM

## 2017-12-19 PROCEDURE — 99214 OFFICE O/P EST MOD 30 MIN: CPT | Performed by: NURSE PRACTITIONER

## 2017-12-19 RX ORDER — CITALOPRAM 20 MG/1
20 TABLET ORAL DAILY
Qty: 90 TABLET | Refills: 0 | Status: SHIPPED | OUTPATIENT
Start: 2017-12-19 | End: 2018-01-22 | Stop reason: SDUPTHER

## 2017-12-19 RX ORDER — HALOPERIDOL 5 MG/1
2.5 TABLET ORAL
Qty: 30 TABLET | Refills: 1 | Status: SHIPPED | OUTPATIENT
Start: 2017-12-19 | End: 2018-01-22 | Stop reason: SDUPTHER
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
## 2018-01-12 ENCOUNTER — OFFICE VISIT (OUTPATIENT)
Dept: PSYCHIATRY | Facility: CLINIC | Age: 63
End: 2018-01-12

## 2018-01-12 DIAGNOSIS — G30.9 ALZHEIMER'S DEMENTIA WITH BEHAVIORAL DISTURBANCE, UNSPECIFIED TIMING OF DEMENTIA ONSET: Primary | ICD-10-CM

## 2018-01-12 DIAGNOSIS — F02.81 ALZHEIMER'S DEMENTIA WITH BEHAVIORAL DISTURBANCE, UNSPECIFIED TIMING OF DEMENTIA ONSET: Primary | ICD-10-CM

## 2018-01-12 PROCEDURE — 90834 PSYTX W PT 45 MINUTES: CPT | Performed by: SOCIAL WORKER
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
## 2018-01-22 ENCOUNTER — OFFICE VISIT (OUTPATIENT)
Dept: PSYCHIATRY | Facility: CLINIC | Age: 63
End: 2018-01-22

## 2018-01-22 VITALS
DIASTOLIC BLOOD PRESSURE: 74 MMHG | HEIGHT: 69 IN | SYSTOLIC BLOOD PRESSURE: 124 MMHG | BODY MASS INDEX: 28.73 KG/M2 | HEART RATE: 79 BPM | WEIGHT: 194 LBS

## 2018-01-22 DIAGNOSIS — F02.81 ALZHEIMER'S DEMENTIA WITH BEHAVIORAL DISTURBANCE, UNSPECIFIED TIMING OF DEMENTIA ONSET: ICD-10-CM

## 2018-01-22 DIAGNOSIS — G30.9 ALZHEIMER'S DEMENTIA WITH BEHAVIORAL DISTURBANCE, UNSPECIFIED TIMING OF DEMENTIA ONSET: ICD-10-CM

## 2018-01-22 PROCEDURE — 99213 OFFICE O/P EST LOW 20 MIN: CPT | Performed by: NURSE PRACTITIONER

## 2018-01-22 RX ORDER — HALOPERIDOL 5 MG/1
2.5 TABLET ORAL
Qty: 90 TABLET | Refills: 0 | Status: SHIPPED | OUTPATIENT
Start: 2018-01-22

## 2018-01-22 RX ORDER — CITALOPRAM 20 MG/1
20 TABLET ORAL DAILY
Qty: 90 TABLET | Refills: 0 | Status: SHIPPED | OUTPATIENT
Start: 2018-01-22
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
## 2018-02-09 ENCOUNTER — OFFICE VISIT (OUTPATIENT)
Dept: PSYCHIATRY | Facility: CLINIC | Age: 63
End: 2018-02-09

## 2018-02-09 DIAGNOSIS — G30.9 ALZHEIMER'S DEMENTIA WITH BEHAVIORAL DISTURBANCE, UNSPECIFIED TIMING OF DEMENTIA ONSET: Primary | ICD-10-CM

## 2018-02-09 DIAGNOSIS — F02.81 ALZHEIMER'S DEMENTIA WITH BEHAVIORAL DISTURBANCE, UNSPECIFIED TIMING OF DEMENTIA ONSET: Primary | ICD-10-CM

## 2018-02-09 PROCEDURE — 90834 PSYTX W PT 45 MINUTES: CPT | Performed by: SOCIAL WORKER

## 2018-02-09 NOTE — PROGRESS NOTES
Date of Service: February 9, 2018  Time In: 1:35 PM  Time Out: 2:17 PM      PROGRESS NOTE  Data:  Sherman Adair is a 62 y.o. male who met, along with his wife, with the undersigned for a regularly scheduled individual outpatient psychotherapy session at  [facility] for follow-up of Alzheimer's dementia.     HPI: The patient's wife reports the patient's behavior has been much improved since beginning Haldol.  However, she states he has experienced significant decrease in appetite and continues to lose weight.  The patient's wife reports he has lost approximately 24 pounds in the last month. The patient reports he simply does not get hungry.  Patient's wife reports she is giving him ensure in an attempt to bolster his nutrition.  Patient's wife reports he continues to have intermittent periods of psychosis as evidence by thinking there are people in the home who are not.  Patient's wife also reports the patient is sleeping approximately 16-18 hours daily.  The patient has limited interaction during the session and primarily focuses on tangential issues.  The patient does report he trust his wife and states he believes she is trying to help him.  This is a marked departure from his attitude during for session where he stated he believed she was unfaithful to him.  Patient's wife reports the patient continues to adhere to medication regimen.  The patient adamantly and convincingly denies suicidal ideation.    Clinical Maneuvering/Intervention:  Assisted patient in processing above session content; acknowledged and normalized patient’s thoughts, feelings, and concerns.  Assisted the patient and his wife in processing the patient's ongoing symptoms in the context of Alzheimer's disease.  The session was primarily focused on healthy skills of daily living including eating a healthy diet, getting as much physical activity as possible, and getting appropriate sleep.  Was able to utilize motivational  interviewing techniques including complex reflections to elicit agreement from the patient he does not want to become malnourished and encouraged him to eat appropriately whether he feels hungry or not.  Encouraged the patient and his wife to keep all appointments and follow recommendations.  Provided unconditional positive regard in a safe, supportive environment.    Allowed patient to freely discuss issues without interruption or judgment. Provided safe, confidential environment to facilitate the development of positive therapeutic relationship and encourage open, honest communication. Assisted patient in identifying risk factors which would indicate the need for higher level of care including thoughts to harm self or others and/or self-harming behavior and encouraged patient to contact this office, call 911, or present to the nearest emergency room should any of these events occur. Discussed crisis intervention services and means to access.  Patient adamantly and convincingly denies current suicidal or homicidal ideation or perceptual disturbance.    Assessment    Patient appears to be struggling with significant Alzheimer's dementia which is evident by her tangential thought process and flat affect.  The patient's symptomology renders him dependent on his wife for daily support.  The patient would likely be at increased risk for decompensation without ongoing treatment.    Diagnoses and all orders for this visit:    Alzheimer's dementia with behavioral disturbance, unspecified timing of dementia onset               Mental Status Exam  Hygiene:  good  Dress:  casual  Attitude:  Cooperative  Motor Activity:  Slow  Speech:  Minimal and Monotone  Mood:  sad  Affect:  flat  Thought Processes:  Tangential  Thought Content:  normal  Suicidal Thoughts:  denies  Homicidal Thoughts:  denies  Crisis Safety Plan: yes, to come to the emergency room.  Hallucinations:  denies    Patient's Support Network Includes:   wife    Progress toward goal: Not at goal    Functional Status: Severe impairment    Prognosis: Fair with Ongoing Treatment     Plan         Patient will continue in individual outpatient psychotherapy session at [facility] in approximately one month and will continue and pharmacotherapy as scheduled with RICHIE Thompson at the [facility].  Patient will adhere to medication regimen as prescribed and report any side effects. Patient will contact this office, call 911 or present to the nearest emergency room should suicidal or homicidal ideations occur. Provide Cognitive Behavioral Therapy and Integrative Therapy to improve functioning, maintain stability, and avoid decompensation and the need for higher level of care.          Return in about 4 weeks (around 03/09/2018) for Next scheduled follow up.      This document signed by Scott Forbes LCSW, [facility] February 9, 2018 4:16 PM

## 2018-04-06 DIAGNOSIS — G30.9 ALZHEIMER'S DEMENTIA WITH BEHAVIORAL DISTURBANCE, UNSPECIFIED TIMING OF DEMENTIA ONSET: ICD-10-CM

## 2018-04-06 DIAGNOSIS — F02.81 ALZHEIMER'S DEMENTIA WITH BEHAVIORAL DISTURBANCE, UNSPECIFIED TIMING OF DEMENTIA ONSET: ICD-10-CM

## 2018-04-09 ENCOUNTER — TELEPHONE (OUTPATIENT)
Dept: PSYCHIATRY | Facility: CLINIC | Age: 63
End: 2018-04-09

## 2018-04-09 RX ORDER — HALOPERIDOL 5 MG/1
TABLET ORAL
Qty: 30 TABLET | Refills: 1 | Status: SHIPPED | OUTPATIENT
Start: 2018-04-09

## 2018-09-24 RX ORDER — CITALOPRAM 20 MG/1
TABLET ORAL
Qty: 30 TABLET | Refills: 1 | OUTPATIENT
Start: 2018-09-24

## 2021-08-18 NOTE — TELEPHONE ENCOUNTER
Called Pharmacy made them aware if he is receving Celexa from another provider not to fill your script of the Celexa. He stated he will cancel the script from you.  
He has had no issues with celexa and haldol and tolerating. Please let vinny know that if a recent RX of celexa filled then I don't need to order for a refill. Thanks.  
Pharmacy called in regards of the Haldol states he is getting Celexa from another provider in [facility], States she is needing verification to go ahead filling the Haldol due to it causing a QT interaction with the Celexa.But I also see you prescribe Celexa as well. Please advise  
independent